# Patient Record
Sex: MALE | URBAN - METROPOLITAN AREA
[De-identification: names, ages, dates, MRNs, and addresses within clinical notes are randomized per-mention and may not be internally consistent; named-entity substitution may affect disease eponyms.]

---

## 2021-11-29 ENCOUNTER — APPOINTMENT (OUTPATIENT)
Dept: URBAN - METROPOLITAN AREA CLINIC 234 | Age: 71
Setting detail: DERMATOLOGY
End: 2021-11-29

## 2021-11-29 VITALS — WEIGHT: 185 LBS | HEIGHT: 68 IN

## 2021-11-29 DIAGNOSIS — D485 NEOPLASM OF UNCERTAIN BEHAVIOR OF SKIN: ICD-10-CM

## 2021-11-29 PROBLEM — D48.5 NEOPLASM OF UNCERTAIN BEHAVIOR OF SKIN: Status: ACTIVE | Noted: 2021-11-29

## 2021-11-29 PROCEDURE — 11102 TANGNTL BX SKIN SINGLE LES: CPT

## 2021-11-29 PROCEDURE — OTHER BIOPSY BY SHAVE METHOD: OTHER

## 2021-11-29 ASSESSMENT — LOCATION ZONE DERM: LOCATION ZONE: FACE

## 2021-11-29 ASSESSMENT — LOCATION DETAILED DESCRIPTION DERM: LOCATION DETAILED: RIGHT MID TEMPLE

## 2021-11-29 ASSESSMENT — LOCATION SIMPLE DESCRIPTION DERM: LOCATION SIMPLE: RIGHT TEMPLE

## 2021-11-29 NOTE — PROCEDURE: BIOPSY BY SHAVE METHOD
Cryotherapy Text: The wound bed was treated with cryotherapy after the biopsy was performed.
Biopsy Type: H and E
Validate Lesion Size: No
Detail Level: Detailed
Notification Instructions: Patient will be notified of biopsy results. However, patient instructed to call the office if not contacted within 2 weeks.
Was A Bandage Applied: Yes
Consent: Written consent was obtained and risks were reviewed including but not limited to scarring, infection, bleeding, scabbing, incomplete removal, nerve damage and allergy to anesthesia.
Billing Type: Third-Party Bill
Anesthesia Volume In Cc (Will Not Render If 0): 0.5
Silver Nitrate Text: The wound bed was treated with silver nitrate after the biopsy was performed.
X Size Of Lesion In Cm: 0
Curettage Text: The wound bed was treated with curettage after the biopsy was performed.
Depth Of Biopsy: dermis
Wound Care: Petrolatum
Electrodesiccation And Curettage Text: The wound bed was treated with electrodesiccation and curettage after the biopsy was performed.
Dressing: bandage
Post-Care Instructions: I reviewed with the patient in detail post-care instructions. Patient is to keep the biopsy site dry overnight, and then apply bacitracin twice daily until healed. Patient may apply hydrogen peroxide soaks to remove any crusting.
Information: Selecting Yes will display possible errors in your note based on the variables you have selected. This validation is only offered as a suggestion for you. PLEASE NOTE THAT THE VALIDATION TEXT WILL BE REMOVED WHEN YOU FINALIZE YOUR NOTE. IF YOU WANT TO FAX A PRELIMINARY NOTE YOU WILL NEED TO TOGGLE THIS TO 'NO' IF YOU DO NOT WANT IT IN YOUR FAXED NOTE.
Anesthesia Type: 1% lidocaine with epinephrine and a 1:10 solution of 8.4% sodium bicarbonate
Biopsy Method: double edge Personna blade
Electrodesiccation Text: The wound bed was treated with electrodesiccation after the biopsy was performed.
Type Of Destruction Used: Curettage
Size Of Lesion In Cm: 0.3
Hemostasis: Nuvia's

## 2022-11-07 ENCOUNTER — APPOINTMENT (OUTPATIENT)
Dept: URBAN - METROPOLITAN AREA CLINIC 234 | Age: 72
Setting detail: DERMATOLOGY
End: 2022-11-07

## 2022-11-07 DIAGNOSIS — L30.9 DERMATITIS, UNSPECIFIED: ICD-10-CM

## 2022-11-07 PROCEDURE — OTHER PRESCRIPTION: OTHER

## 2022-11-07 PROCEDURE — 96372 THER/PROPH/DIAG INJ SC/IM: CPT | Mod: 59

## 2022-11-07 PROCEDURE — OTHER INTRAMUSCULAR KENALOG: OTHER

## 2022-11-07 PROCEDURE — OTHER MIPS QUALITY: OTHER

## 2022-11-07 PROCEDURE — 11104 PUNCH BX SKIN SINGLE LESION: CPT

## 2022-11-07 PROCEDURE — OTHER BIOPSY BY PUNCH METHOD: OTHER

## 2022-11-07 PROCEDURE — 11105 PUNCH BX SKIN EA SEP/ADDL: CPT

## 2022-11-07 PROCEDURE — OTHER COUNSELING: OTHER

## 2022-11-07 RX ORDER — PREDNISONE 20 MG/1
TABLET ORAL
Qty: 42 | Refills: 0 | Status: ERX | COMMUNITY
Start: 2022-11-07

## 2022-11-07 ASSESSMENT — LOCATION SIMPLE DESCRIPTION DERM
LOCATION SIMPLE: RIGHT BUTTOCK
LOCATION SIMPLE: LEFT ANTERIOR NECK

## 2022-11-07 ASSESSMENT — LOCATION ZONE DERM
LOCATION ZONE: NECK
LOCATION ZONE: TRUNK

## 2022-11-07 ASSESSMENT — LOCATION DETAILED DESCRIPTION DERM
LOCATION DETAILED: LEFT CLAVICULAR NECK
LOCATION DETAILED: RIGHT BUTTOCK

## 2022-11-07 NOTE — PROCEDURE: MIPS QUALITY
Quality 431: Preventive Care And Screening: Unhealthy Alcohol Use - Screening: Patient not identified as an unhealthy alcohol user when screened for unhealthy alcohol use using a systematic screening method
Quality 111:Pneumonia Vaccination Status For Older Adults: Pneumococcal vaccine (PPSV23) was not administered on or after patient’s 60th birthday and before the end of the measurement period, reason not otherwise specified
Detail Level: Detailed
Quality 110: Preventive Care And Screening: Influenza Immunization: Influenza immunization was not ordered or administered, reason not given
Quality 226: Preventive Care And Screening: Tobacco Use: Screening And Cessation Intervention: Patient screened for tobacco use and is an ex/non-smoker
Quality 130: Documentation Of Current Medications In The Medical Record: Current Medications Documented

## 2022-11-07 NOTE — PROCEDURE: BIOPSY BY PUNCH METHOD
Problem: Communication  Goal: The ability to communicate needs accurately and effectively will improve  Outcome: PROGRESSING AS EXPECTED     Problem: Safety  Goal: Will remain free from injury  Outcome: PROGRESSING AS EXPECTED     Problem: Infection  Goal: Will remain free from infection  Outcome: PROGRESSING AS EXPECTED     
  Problem: Communication  Goal: The ability to communicate needs accurately and effectively will improve  Outcome: PROGRESSING AS EXPECTED     Problem: Safety  Goal: Will remain free from injury  Outcome: PROGRESSING AS EXPECTED     Problem: Knowledge Deficit  Goal: Knowledge of disease process/condition, treatment plan, diagnostic tests, and medications will improve  Outcome: PROGRESSING AS EXPECTED     
  Problem: Communication  Goal: The ability to communicate needs accurately and effectively will improve  Outcome: PROGRESSING AS EXPECTED     Problem: Safety  Goal: Will remain free from injury  Outcome: PROGRESSING AS EXPECTED     Problem: Knowledge Deficit  Goal: Knowledge of disease process/condition, treatment plan, diagnostic tests, and medications will improve  Outcome: PROGRESSING AS EXPECTED     Problem: Pain Management  Goal: Pain level will decrease to patient's comfort goal  Outcome: PROGRESSING AS EXPECTED     Plan of care discussed with patient. Encouraged to voice feelings or concerns. Calls appropriately. Hourly rounding and fall precautions in place. Bed alarm on, bed locked and in lowest position. Call light and bedside table within reach. Pain well managed. Encouraged to call if pain becomes no longer tolerable.  
  Problem: Communication  Goal: The ability to communicate needs accurately and effectively will improve  Outcome: PROGRESSING AS EXPECTED     Problem: Safety  Goal: Will remain free from injury  Outcome: PROGRESSING AS EXPECTED  Goal: Will remain free from falls  Outcome: PROGRESSING AS EXPECTED     Problem: Infection  Goal: Will remain free from infection  Outcome: PROGRESSING AS EXPECTED     Problem: Venous Thromboembolism (VTW)/Deep Vein Thrombosis (DVT) Prevention:  Goal: Patient will participate in Venous Thrombosis (VTE)/Deep Vein Thrombosis (DVT)Prevention Measures  Outcome: PROGRESSING AS EXPECTED     
  Problem: Communication  Goal: The ability to communicate needs accurately and effectively will improve  Outcome: PROGRESSING AS EXPECTED     Problem: Safety  Goal: Will remain free from injury  Outcome: PROGRESSING AS EXPECTED  Goal: Will remain free from falls  Outcome: PROGRESSING AS EXPECTED     Problem: Infection  Goal: Will remain free from infection  Outcome: PROGRESSING AS EXPECTED     Problem: Venous Thromboembolism (VTW)/Deep Vein Thrombosis (DVT) Prevention:  Goal: Patient will participate in Venous Thrombosis (VTE)/Deep Vein Thrombosis (DVT)Prevention Measures  Outcome: PROGRESSING AS EXPECTED     Problem: Bowel/Gastric:  Goal: Normal bowel function is maintained or improved  Outcome: PROGRESSING AS EXPECTED  Goal: Will not experience complications related to bowel motility  Outcome: PROGRESSING AS EXPECTED     
  Problem: Communication  Goal: The ability to communicate needs accurately and effectively will improve  Outcome: PROGRESSING AS EXPECTED     Problem: Safety  Goal: Will remain free from injury  Outcome: PROGRESSING AS EXPECTED  Goal: Will remain free from falls  Outcome: PROGRESSING AS EXPECTED     Problem: Infection  Goal: Will remain free from infection  Outcome: PROGRESSING AS EXPECTED     Problem: Venous Thromboembolism (VTW)/Deep Vein Thrombosis (DVT) Prevention:  Goal: Patient will participate in Venous Thrombosis (VTE)/Deep Vein Thrombosis (DVT)Prevention Measures  Outcome: PROGRESSING AS EXPECTED     Problem: Bowel/Gastric:  Goal: Normal bowel function is maintained or improved  Outcome: PROGRESSING AS EXPECTED  Goal: Will not experience complications related to bowel motility  Outcome: PROGRESSING AS EXPECTED     Problem: Knowledge Deficit  Goal: Knowledge of disease process/condition, treatment plan, diagnostic tests, and medications will improve  Outcome: PROGRESSING AS EXPECTED  Goal: Knowledge of the prescribed therapeutic regimen will improve  Outcome: PROGRESSING AS EXPECTED     Problem: Pain Management  Goal: Pain level will decrease to patient's comfort goal  Outcome: PROGRESSING AS EXPECTED     Problem: Respiratory:  Goal: Respiratory status will improve  Outcome: PROGRESSING AS EXPECTED     Problem: Skin Integrity  Goal: Risk for impaired skin integrity will decrease  Outcome: PROGRESSING AS EXPECTED     
  Problem: Communication  Goal: The ability to communicate needs accurately and effectively will improve  Outcome: PROGRESSING AS EXPECTED   Plan of care reviewed with patient, verbalizes understanding. Call light in reach, calls appropriately.    Problem: Safety  Goal: Will remain free from injury  Outcome: PROGRESSING AS EXPECTED  Fall precautions in place. Pt rounded on hourly. Bed alarm on, bed locked and in lowest position.     Problem: Knowledge Deficit  Goal: Knowledge of disease process/condition, treatment plan, diagnostic tests, and medications will improve  Outcome: PROGRESSING AS EXPECTED     Problem: Pain Management  Goal: Pain level will decrease to patient's comfort goal  Outcome: PROGRESSING AS EXPECTED   Pain managed by PRN medication. Encouraged to call if pain no longer tolerable.  
  Problem: Infection  Goal: Will remain free from infection  Intervention: Implement standard precautions and perform hand washing before and after patient contact  Note: Standard precuations and hand hygiene performed before during and after each patient interactoin        Problem: Venous Thromboembolism (VTW)/Deep Vein Thrombosis (DVT) Prevention:  Goal: Patient will participate in Venous Thrombosis (VTE)/Deep Vein Thrombosis (DVT)Prevention Measures  Intervention: Ensure patient wears graduated elastic stockings (RENETTA hose) and/or SCDs, if ordered, when in bed or chair (Remove at least once per shift for skin check)  Note: SCDs will remain on while patient remains in bed except when performing 2 Rn skin check and bed bath       
  Problem: Pain Management  Goal: Pain level will decrease to patient's comfort goal  Outcome: PROGRESSING AS EXPECTED  Intervention: Follow pain managment plan developed in collaboration with patient and Interdisciplinary Team  Note: Medicating patient appropriately with prescribed regimen. Patient reporting decreased pain and showing no signs of distress       Problem: Skin Integrity  Goal: Risk for impaired skin integrity will decrease  Outcome: PROGRESSING AS EXPECTED  Intervention: Assess risk factors for impaired skin integrity and/or pressure ulcers  Note: Assessing skin integrity daily. Q2 turns in place. Rotating medical devices, mepilex in place, barrier cream as needed.       
  Problem: Respiratory:  Goal: Respiratory status will improve  Intervention: Administer and titrate oxygen therapy  Note: Pt currently on 1L nasal cannula. Will titrate as tolerated throughout the night        Problem: Knowledge Deficit  Goal: Knowledge of the prescribed therapeutic regimen will improve  Intervention: Discuss information regarding therpeutic regimen and document in education  Note: Discussion and education with family regarding current injury, prognosis and plan for rehabilitation     
  Problem: Respiratory:  Goal: Respiratory status will improve  Intervention: Educate and encourage incentive spirometry usage  Note: Current IS volume is 500. Will provide education/encouragement to increase effectiveness     Problem: Mobility  Goal: Risk for activity intolerance will decrease  Intervention: Encourage patient to increase activity level in collaboration with Interdisciplinary Team  Note: Provided education regarding mobilization. Plan to mobilize up to chair in early morning.      
  Problem: Safety  Goal: Will remain free from falls  Outcome: PROGRESSING AS EXPECTED     Problem: Bowel/Gastric:  Goal: Normal bowel function is maintained or improved  Outcome: PROGRESSING SLOWER THAN EXPECTED  Note: Pt receiving bowel meds as appropriate     Problem: Pain Management  Goal: Pain level will decrease to patient's comfort goal  Outcome: PROGRESSING AS EXPECTED     Problem: Respiratory:  Goal: Respiratory status will improve  Outcome: PROGRESSING AS EXPECTED     
  Problem: Safety  Goal: Will remain free from injury  Outcome: NOT MET     Problem: Infection  Goal: Will remain free from infection  Outcome: PROGRESSING AS EXPECTED     Problem: Venous Thromboembolism (VTW)/Deep Vein Thrombosis (DVT) Prevention:  Goal: Patient will participate in Venous Thrombosis (VTE)/Deep Vein Thrombosis (DVT)Prevention Measures  Outcome: PROGRESSING AS EXPECTED     Problem: Bowel/Gastric:  Goal: Normal bowel function is maintained or improved  Outcome: NOT MET     Problem: Knowledge Deficit  Goal: Knowledge of disease process/condition, treatment plan, diagnostic tests, and medications will improve  Outcome: NOT MET     Problem: Respiratory:  Goal: Respiratory status will improve  Outcome: NOT MET     Problem: Skin Integrity  Goal: Risk for impaired skin integrity will decrease  Outcome: NOT MET     
  Problem: Safety  Goal: Will remain free from injury  Outcome: PROGRESSING AS EXPECTED  Goal: Will remain free from falls  Outcome: PROGRESSING AS EXPECTED     Problem: Venous Thromboembolism (VTW)/Deep Vein Thrombosis (DVT) Prevention:  Goal: Patient will participate in Venous Thrombosis (VTE)/Deep Vein Thrombosis (DVT)Prevention Measures  Outcome: PROGRESSING AS EXPECTED  Lovenox and SCDs in use     Problem: Pain Management  Goal: Pain level will decrease to patient's comfort goal  Outcome: PROGRESSING AS EXPECTED   PRN pain meds in use  
  Problem: Skin Integrity  Goal: Risk for impaired skin integrity will decrease  Outcome: PROGRESSING AS EXPECTED  Intervention: Assess risk factors for impaired skin integrity and/or pressure ulcers  Note: Assessing skin integrity daily. Q2 turns in place. Rotating medical devices, mepilex in place, barrier cream as needed.       Problem: Pain Management  Goal: Pain level will decrease to patient's comfort goal  Flowsheets (Taken 2/15/2021 8856)  Non Verbal Scale: Tense Body Language  Pain Rating Scale (NPRS): 8  Note: Medicating patient appropriately with prescribed regimen. Patient reporting decreased pain and showing no signs of distress       
  Problem: Venous Thromboembolism (VTW)/Deep Vein Thrombosis (DVT) Prevention:  Goal: Patient will participate in Venous Thrombosis (VTE)/Deep Vein Thrombosis (DVT)Prevention Measures  Outcome: PROGRESSING AS EXPECTED  Lovenox in use and SCDs on     Problem: Pain Management  Goal: Pain level will decrease to patient's comfort goal  Outcome: PROGRESSING AS EXPECTED   PRN pain meds in use  
Problem: Discharge Barriers/Planning  Goal: Patient's continuum of care needs will be met  Outcome: PROGRESSING AS EXPECTED  Patient is up for discharge today to  Rehab.     Problem: Pain Management  Goal: Pain level will decrease to patient's comfort goal  Outcome: PROGRESSING AS EXPECTED  Patient's pain is well controlled with current pain regimen. Pt understands to notify staff if pain increases. Call light within reach.     
Problem: Mobility  Goal: Risk for activity intolerance will decrease  Outcome: PROGRESSING AS EXPECTED  Patient able to stand and transfer from bed to recliner with 2-person hand-held assist. Patient has weakness in left leg and needs assistance moving leg during ambulation.     Problem: Urinary Elimination:  Goal: Ability to reestablish a normal urinary elimination pattern will improve  Outcome: PROGRESSING AS EXPECTED  Patient is voiding without complications. Incontinent at times. PureWick in place.   
Problem: Psychosocial Needs:  Goal: Level of anxiety will decrease  Outcome: PROGRESSING AS EXPECTED  Patient has no complaints of anxiety at this time.  at bedside.     Problem: Mobility  Goal: Risk for activity intolerance will decrease  Outcome: PROGRESSING AS EXPECTED  Patient is working with PT/OT and encouraged to transfer to chair for meals.   
Biopsy Type: H and E
Additional Anesthesia Volume In Cc (Will Not Render If 0): 0
Biopsy Type: DIF
Consent: Written consent was obtained and risks were reviewed including but not limited to scarring, infection, bleeding, scabbing, incomplete removal, nerve damage and allergy to anesthesia.
Anesthesia Type: 1% lidocaine with epinephrine
Wound Care: Petrolatum
Was A Bandage Applied: Yes
Bill For Surgical Tray: no
Epidermal Sutures: 4-0 Ethilon
Home Suture Removal Text: Patient was provided a home suture removal kit and will remove their sutures at home.  If they have any questions or difficulties they will call the office.
Dressing: bandage
Suture Removal: 14 days
Billing Type: Third-Party Bill
Punch Size In Mm: 4
Detail Level: Detailed
Hemostasis: None
Post-Care Instructions: I reviewed with the patient in detail post-care instructions. Patient is to keep the biopsy site dry overnight, and then apply bacitracin twice daily until healed. Patient may apply hydrogen peroxide soaks to remove any crusting.
Information: Selecting Yes will display possible errors in your note based on the variables you have selected. This validation is only offered as a suggestion for you. PLEASE NOTE THAT THE VALIDATION TEXT WILL BE REMOVED WHEN YOU FINALIZE YOUR NOTE. IF YOU WANT TO FAX A PRELIMINARY NOTE YOU WILL NEED TO TOGGLE THIS TO 'NO' IF YOU DO NOT WANT IT IN YOUR FAXED NOTE.
Anesthesia Volume In Cc (Will Not Render If 0): 0.5
Notification Instructions: Patient will be notified of biopsy results. However, patient instructed to call the office if not contacted within 2 weeks.

## 2022-11-21 ENCOUNTER — APPOINTMENT (OUTPATIENT)
Dept: URBAN - METROPOLITAN AREA CLINIC 234 | Age: 72
Setting detail: DERMATOLOGY
End: 2022-11-21

## 2022-11-21 DIAGNOSIS — Z48.02 ENCOUNTER FOR REMOVAL OF SUTURES: ICD-10-CM

## 2022-11-21 DIAGNOSIS — L93.0 DISCOID LUPUS ERYTHEMATOSUS: ICD-10-CM

## 2022-11-21 PROCEDURE — OTHER INTRAMUSCULAR KENALOG: OTHER

## 2022-11-21 PROCEDURE — 96372 THER/PROPH/DIAG INJ SC/IM: CPT

## 2022-11-21 PROCEDURE — OTHER MIPS QUALITY: OTHER

## 2022-11-21 PROCEDURE — OTHER ORDER TESTS: OTHER

## 2022-11-21 PROCEDURE — OTHER COUNSELING: OTHER

## 2022-11-21 PROCEDURE — OTHER SUTURE REMOVAL (GLOBAL PERIOD): OTHER

## 2022-11-21 ASSESSMENT — LOCATION DETAILED DESCRIPTION DERM
LOCATION DETAILED: LEFT CLAVICULAR SKIN
LOCATION DETAILED: LEFT LATERAL SUPERIOR CHEST
LOCATION DETAILED: LEFT CLAVICULAR SKIN
LOCATION DETAILED: LEFT CLAVICULAR NECK
LOCATION DETAILED: LEFT INFERIOR LATERAL LOWER BACK

## 2022-11-21 ASSESSMENT — LOCATION ZONE DERM
LOCATION ZONE: TRUNK
LOCATION ZONE: TRUNK
LOCATION ZONE: NECK

## 2022-11-21 ASSESSMENT — LOCATION SIMPLE DESCRIPTION DERM
LOCATION SIMPLE: CHEST
LOCATION SIMPLE: LEFT CLAVICULAR SKIN
LOCATION SIMPLE: LEFT CLAVICULAR SKIN
LOCATION SIMPLE: LEFT ANTERIOR NECK
LOCATION SIMPLE: LEFT LOWER BACK

## 2022-11-21 NOTE — PROCEDURE: SUTURE REMOVAL (GLOBAL PERIOD)
Detail Level: Detailed
Add 39993 Cpt? (Important Note: In 2017 The Use Of 00179 Is Being Tracked By Cms To Determine Future Global Period Reimbursement For Global Periods): no

## 2022-11-21 NOTE — PROCEDURE: SUTURE REMOVAL (GLOBAL PERIOD)
Add 19753 Cpt? (Important Note: In 2017 The Use Of 00645 Is Being Tracked By Cms To Determine Future Global Period Reimbursement For Global Periods): no
Detail Level: Detailed

## 2022-12-08 ENCOUNTER — APPOINTMENT (OUTPATIENT)
Dept: URBAN - METROPOLITAN AREA CLINIC 234 | Age: 72
Setting detail: DERMATOLOGY
End: 2022-12-08

## 2022-12-08 DIAGNOSIS — D89.89 OTHER SPECIFIED DISORDERS INVOLVING THE IMMUNE MECHANISM, NOT ELSEWHERE CLASSIFIED: ICD-10-CM

## 2022-12-08 PROCEDURE — OTHER DEFER: OTHER

## 2022-12-08 PROCEDURE — OTHER COUNSELING: OTHER

## 2022-12-08 PROCEDURE — 99212 OFFICE O/P EST SF 10 MIN: CPT

## 2022-12-08 PROCEDURE — OTHER MIPS QUALITY: OTHER

## 2022-12-08 NOTE — PROCEDURE: DEFER
X Size Of Lesion In Cm (Optional): 0
Reason To Defer Override: dr garg
Introduction Text (Please End With A Colon): The following procedure was deferred:
Detail Level: Detailed

## 2022-12-08 NOTE — PROCEDURE: MIPS QUALITY
Quality 130: Documentation Of Current Medications In The Medical Record: Current Medications Documented
Quality 226: Preventive Care And Screening: Tobacco Use: Screening And Cessation Intervention: Patient screened for tobacco use and is an ex/non-smoker
Detail Level: Detailed
Quality 431: Preventive Care And Screening: Unhealthy Alcohol Use - Screening: Patient not identified as an unhealthy alcohol user when screened for unhealthy alcohol use using a systematic screening method
Quality 110: Preventive Care And Screening: Influenza Immunization: Influenza Immunization previously received during influenza season
Quality 111:Pneumonia Vaccination Status For Older Adults: Pneumococcal vaccine (PPSV23) administered on or after patient’s 60th birthday and before the end of the measurement period

## 2023-07-11 ENCOUNTER — HOSPITAL ENCOUNTER (OUTPATIENT)
Dept: HOSPITAL 47 - LABWHC1 | Age: 73
Discharge: HOME | End: 2023-07-11
Attending: ORTHOPAEDIC SURGERY
Payer: COMMERCIAL

## 2023-07-11 DIAGNOSIS — Z96.641: ICD-10-CM

## 2023-07-11 DIAGNOSIS — M25.551: Primary | ICD-10-CM

## 2023-07-11 DIAGNOSIS — Z47.1: ICD-10-CM

## 2023-07-11 PROCEDURE — 86140 C-REACTIVE PROTEIN: CPT

## 2023-07-11 PROCEDURE — 36415 COLL VENOUS BLD VENIPUNCTURE: CPT

## 2023-09-20 ENCOUNTER — APPOINTMENT (OUTPATIENT)
Dept: URBAN - NONMETROPOLITAN AREA CLINIC 51 | Age: 73
Setting detail: DERMATOLOGY
End: 2023-09-21

## 2023-09-20 DIAGNOSIS — L73.8 OTHER SPECIFIED FOLLICULAR DISORDERS: ICD-10-CM

## 2023-09-20 PROCEDURE — OTHER MIPS QUALITY: OTHER

## 2023-09-20 PROCEDURE — OTHER COUNSELING: OTHER

## 2023-09-20 PROCEDURE — 99212 OFFICE O/P EST SF 10 MIN: CPT

## 2023-09-20 ASSESSMENT — LOCATION ZONE DERM
LOCATION ZONE: LIP
LOCATION ZONE: FACE
LOCATION ZONE: ARM

## 2023-09-20 ASSESSMENT — LOCATION DETAILED DESCRIPTION DERM
LOCATION DETAILED: LEFT SUPERIOR LATERAL BUCCAL CHEEK
LOCATION DETAILED: LEFT LATERAL MALAR CHEEK
LOCATION DETAILED: LEFT ANTERIOR SHOULDER
LOCATION DETAILED: RIGHT SUPERIOR LATERAL BUCCAL CHEEK
LOCATION DETAILED: RIGHT LOWER CUTANEOUS LIP

## 2023-09-20 ASSESSMENT — LOCATION SIMPLE DESCRIPTION DERM
LOCATION SIMPLE: LEFT SHOULDER
LOCATION SIMPLE: RIGHT CHEEK
LOCATION SIMPLE: LEFT CHEEK
LOCATION SIMPLE: RIGHT LIP

## 2023-11-15 ENCOUNTER — APPOINTMENT (OUTPATIENT)
Dept: URBAN - NONMETROPOLITAN AREA CLINIC 51 | Age: 73
Setting detail: DERMATOLOGY
End: 2023-11-15

## 2023-11-15 DIAGNOSIS — L57.0 ACTINIC KERATOSIS: ICD-10-CM

## 2023-11-15 DIAGNOSIS — L81.0 POSTINFLAMMATORY HYPERPIGMENTATION: ICD-10-CM

## 2023-11-15 DIAGNOSIS — D89.89 OTHER SPECIFIED DISORDERS INVOLVING THE IMMUNE MECHANISM, NOT ELSEWHERE CLASSIFIED: ICD-10-CM

## 2023-11-15 PROCEDURE — 17000 DESTRUCT PREMALG LESION: CPT

## 2023-11-15 PROCEDURE — OTHER COUNSELING: OTHER

## 2023-11-15 PROCEDURE — 96372 THER/PROPH/DIAG INJ SC/IM: CPT | Mod: 59

## 2023-11-15 PROCEDURE — OTHER INTRAMUSCULAR KENALOG: OTHER

## 2023-11-15 PROCEDURE — OTHER LIQUID NITROGEN: OTHER

## 2023-11-15 PROCEDURE — 99212 OFFICE O/P EST SF 10 MIN: CPT | Mod: 25

## 2023-11-15 ASSESSMENT — LOCATION DETAILED DESCRIPTION DERM
LOCATION DETAILED: RIGHT INFERIOR CENTRAL MALAR CHEEK
LOCATION DETAILED: LEFT INFERIOR CENTRAL MALAR CHEEK
LOCATION DETAILED: RIGHT CLAVICULAR NECK
LOCATION DETAILED: RIGHT INFERIOR LATERAL NECK
LOCATION DETAILED: RIGHT BUTTOCK
LOCATION DETAILED: LEFT SUPERIOR LATERAL BUCCAL CHEEK
LOCATION DETAILED: LEFT SUPERIOR LATERAL BUCCAL CHEEK

## 2023-11-15 ASSESSMENT — LOCATION SIMPLE DESCRIPTION DERM
LOCATION SIMPLE: RIGHT BUTTOCK
LOCATION SIMPLE: RIGHT ANTERIOR NECK
LOCATION SIMPLE: LEFT CHEEK
LOCATION SIMPLE: RIGHT CHEEK
LOCATION SIMPLE: LEFT CHEEK

## 2023-11-15 ASSESSMENT — LOCATION ZONE DERM
LOCATION ZONE: TRUNK
LOCATION ZONE: FACE
LOCATION ZONE: FACE
LOCATION ZONE: NECK

## 2024-10-29 ENCOUNTER — HOSPITAL ENCOUNTER (OUTPATIENT)
Dept: HOSPITAL 47 - EC | Age: 74
Setting detail: OBSERVATION
LOS: 2 days | Discharge: HOME | End: 2024-10-31
Attending: HOSPITALIST | Admitting: HOSPITALIST
Payer: MEDICARE

## 2024-10-29 DIAGNOSIS — M47.812: Primary | ICD-10-CM

## 2024-10-29 DIAGNOSIS — Z87.891: ICD-10-CM

## 2024-10-29 DIAGNOSIS — I10: ICD-10-CM

## 2024-10-29 DIAGNOSIS — Z11.52: ICD-10-CM

## 2024-10-29 DIAGNOSIS — Z91.030: ICD-10-CM

## 2024-10-29 DIAGNOSIS — Z11.59: ICD-10-CM

## 2024-10-29 DIAGNOSIS — H53.9: ICD-10-CM

## 2024-10-29 DIAGNOSIS — J02.9: ICD-10-CM

## 2024-10-29 DIAGNOSIS — G89.29: ICD-10-CM

## 2024-10-29 DIAGNOSIS — M54.9: ICD-10-CM

## 2024-10-29 DIAGNOSIS — Z79.899: ICD-10-CM

## 2024-10-29 LAB
ALBUMIN SERPL-MCNC: 4.4 G/DL (ref 3.5–5)
ALP SERPL-CCNC: 58 U/L (ref 38–126)
ALT SERPL-CCNC: 12 U/L (ref 4–49)
ANION GAP SERPL CALC-SCNC: 8 MMOL/L
AST SERPL-CCNC: 23 U/L (ref 17–59)
BASOPHILS # BLD AUTO: 0 K/UL (ref 0–0.2)
BASOPHILS NFR BLD AUTO: 1 %
BUN SERPL-SCNC: 10 MG/DL (ref 9–20)
CALCIUM SPEC-MCNC: 8.9 MG/DL (ref 8.4–10.2)
CHLORIDE SERPL-SCNC: 106 MMOL/L (ref 98–107)
CO2 SERPL-SCNC: 24 MMOL/L (ref 22–30)
EOSINOPHIL # BLD AUTO: 0.1 K/UL (ref 0–0.7)
EOSINOPHIL NFR BLD AUTO: 1 %
ERYTHROCYTE [DISTWIDTH] IN BLOOD BY AUTOMATED COUNT: 4.53 M/UL (ref 4.3–5.9)
ERYTHROCYTE [DISTWIDTH] IN BLOOD: 11.7 % (ref 11.5–15.5)
GLUCOSE BLD-MCNC: 82 MG/DL (ref 70–110)
GLUCOSE SERPL-MCNC: 47 MG/DL (ref 74–99)
HCT VFR BLD AUTO: 44.7 % (ref 39–53)
HGB BLD-MCNC: 14.6 GM/DL (ref 13–17.5)
LYMPHOCYTES # SPEC AUTO: 1.9 K/UL (ref 1–4.8)
LYMPHOCYTES NFR SPEC AUTO: 24 %
MCH RBC QN AUTO: 32.3 PG (ref 25–35)
MCHC RBC AUTO-ENTMCNC: 32.7 G/DL (ref 31–37)
MCV RBC AUTO: 98.7 FL (ref 80–100)
MONOCYTES # BLD AUTO: 0.5 K/UL (ref 0–1)
MONOCYTES NFR BLD AUTO: 6 %
NEUTROPHILS # BLD AUTO: 5.3 K/UL (ref 1.3–7.7)
NEUTROPHILS NFR BLD AUTO: 67 %
PLATELET # BLD AUTO: 266 K/UL (ref 150–450)
POTASSIUM SERPL-SCNC: 4 MMOL/L (ref 3.5–5.1)
PROT SERPL-MCNC: 7.2 G/DL (ref 6.3–8.2)
SODIUM SERPL-SCNC: 138 MMOL/L (ref 137–145)
WBC # BLD AUTO: 7.9 K/UL (ref 3.8–10.6)

## 2024-10-29 PROCEDURE — 96365 THER/PROPH/DIAG IV INF INIT: CPT

## 2024-10-29 PROCEDURE — 87070 CULTURE OTHR SPECIMN AEROBIC: CPT

## 2024-10-29 PROCEDURE — 72125 CT NECK SPINE W/O DYE: CPT

## 2024-10-29 PROCEDURE — 82565 ASSAY OF CREATININE: CPT

## 2024-10-29 PROCEDURE — 96368 THER/DIAG CONCURRENT INF: CPT

## 2024-10-29 PROCEDURE — 83605 ASSAY OF LACTIC ACID: CPT

## 2024-10-29 PROCEDURE — 96372 THER/PROPH/DIAG INJ SC/IM: CPT

## 2024-10-29 PROCEDURE — 87529 HSV DNA AMP PROBE: CPT

## 2024-10-29 PROCEDURE — 99284 EMERGENCY DEPT VISIT MOD MDM: CPT

## 2024-10-29 PROCEDURE — 83036 HEMOGLOBIN GLYCOSYLATED A1C: CPT

## 2024-10-29 PROCEDURE — 70450 CT HEAD/BRAIN W/O DYE: CPT

## 2024-10-29 PROCEDURE — 87798 DETECT AGENT NOS DNA AMP: CPT

## 2024-10-29 PROCEDURE — 82607 VITAMIN B-12: CPT

## 2024-10-29 PROCEDURE — 82746 ASSAY OF FOLIC ACID SERUM: CPT

## 2024-10-29 PROCEDURE — 84157 ASSAY OF PROTEIN OTHER: CPT

## 2024-10-29 PROCEDURE — 80176 ASSAY OF LIDOCAINE: CPT

## 2024-10-29 PROCEDURE — 89050 BODY FLUID CELL COUNT: CPT

## 2024-10-29 PROCEDURE — 71046 X-RAY EXAM CHEST 2 VIEWS: CPT

## 2024-10-29 PROCEDURE — 87636 SARSCOV2 & INF A&B AMP PRB: CPT

## 2024-10-29 PROCEDURE — 80202 ASSAY OF VANCOMYCIN: CPT

## 2024-10-29 PROCEDURE — 86140 C-REACTIVE PROTEIN: CPT

## 2024-10-29 PROCEDURE — 96367 TX/PROPH/DG ADDL SEQ IV INF: CPT

## 2024-10-29 PROCEDURE — 80053 COMPREHEN METABOLIC PANEL: CPT

## 2024-10-29 PROCEDURE — 87498 ENTEROVIRUS PROBE&REVRS TRNS: CPT

## 2024-10-29 PROCEDURE — 99285 EMERGENCY DEPT VISIT HI MDM: CPT

## 2024-10-29 PROCEDURE — 96366 THER/PROPH/DIAG IV INF ADDON: CPT

## 2024-10-29 PROCEDURE — 62270 DX LMBR SPI PNXR: CPT

## 2024-10-29 PROCEDURE — 96375 TX/PRO/DX INJ NEW DRUG ADDON: CPT

## 2024-10-29 PROCEDURE — 36415 COLL VENOUS BLD VENIPUNCTURE: CPT

## 2024-10-29 PROCEDURE — 84443 ASSAY THYROID STIM HORMONE: CPT

## 2024-10-29 PROCEDURE — 85025 COMPLETE CBC W/AUTO DIFF WBC: CPT

## 2024-10-29 PROCEDURE — 96376 TX/PRO/DX INJ SAME DRUG ADON: CPT

## 2024-10-29 PROCEDURE — 82945 GLUCOSE OTHER FLUID: CPT

## 2024-10-29 PROCEDURE — 87651 STREP A DNA AMP PROBE: CPT

## 2024-10-29 PROCEDURE — 87040 BLOOD CULTURE FOR BACTERIA: CPT

## 2024-10-29 PROCEDURE — 85652 RBC SED RATE AUTOMATED: CPT

## 2024-10-29 PROCEDURE — 87205 SMEAR GRAM STAIN: CPT

## 2024-10-29 PROCEDURE — 86592 SYPHILIS TEST NON-TREP QUAL: CPT

## 2024-10-29 PROCEDURE — 80048 BASIC METABOLIC PNL TOTAL CA: CPT

## 2024-10-29 PROCEDURE — 87496 CYTOMEG DNA AMP PROBE: CPT

## 2024-10-29 RX ADMIN — KETOROLAC TROMETHAMINE STA MG: 15 INJECTION, SOLUTION INTRAMUSCULAR; INTRAVENOUS at 21:24

## 2024-10-29 RX ADMIN — ACETAMINOPHEN STA MG: 500 TABLET ORAL at 17:33

## 2024-10-29 RX ADMIN — SODIUM CHLORIDE STA: 9 INJECTION, SOLUTION INTRAVENOUS at 23:32

## 2024-10-29 RX ADMIN — DEXTROSE STA MG: 50 INJECTION, SOLUTION INTRAVENOUS at 21:19

## 2024-10-29 RX ADMIN — AMPICILLIN STA MLS/HR: 2 INJECTION, POWDER, FOR SOLUTION INTRAVENOUS at 22:28

## 2024-10-29 NOTE — CT
EXAMINATION TYPE: CT brain wo con

 

DATE OF EXAM: 10/29/2024

 

COMPARISON: None

 

INDICATION: fever/neck ache, neck stiffness, vision changes

 

DLP: 1164.4 mGycm, Automated exposure control for dose reduction was used.

 

CONTRAST: None

 

CT of the brain is performed utilizing 3 mm thick sections through the posterior fossa and 3 mm thick
 sections through the remaining calvarium.  Study is performed within 24 hours of arrival to the hosp
ital. 

 

No abnormal hyperdensity is present to suggest an acute intracranial hemorrhage.

No mass lesion is evident.

No acute infarcts are evident.

Ventricles and sulci are appropriate for the patient age.  Minimal prominence of the extra-axial spac
es is present.

Paranasal sinuses and mastoid air cells within the field-of-view are clear.

 

IMPRESSION:

1.   No acute intracranial process. Follow up MRI can be performed as clinically indicated.

 

 

 

[facility], Workstation: [facility], 10/29/2024 5:11 PM

## 2024-10-29 NOTE — CT
EXAMINATION TYPE: CT cervical spine wo con

 

DATE OF EXAM: 10/29/2024

 

COMPARISON: None

 

HISTORY: Neck pain.

 

CT DLP: 457.5 mGycm

 

CONTRAST: None

 

CT of the cervical spine is performed in the axial plane at 2 mm thick sections.  Reconstructed image
s in the coronal, and sagittal plane are reviewed on the computer. 

 

No acute fractures are evident.

 

Vertebral body alignment is normal.

There is loss of disc height C6-7 with vacuum disc phenomenon. Disc space narrowing is present C5-6. 
Small posterior inferior spur is at C5. 

Vertebral body heights are preserved.

No spinal canal stenosis is evident

Left foraminal narrowing from facet hypertrophy and uncovertebral joint hypertrophy is present at C3-
4. Mild bilateral foraminal narrowing is present C5-6 and on the left at C6-7

 

IMPRESSION:

1.   No acute osseous abnormality cervical spine.

2. 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

 

[facility], Workstation: [facility], 10/29/2024 7:24 PM

## 2024-10-29 NOTE — ED
Neck Injury/Pain HPI





- General


Chief Complaint: Neck Pain/Injury


Stated Complaint: poss meningitis


Time Seen by Provider: 10/29/24 16:00


Source: patient, RN notes reviewed


Mode of arrival: ambulatory


Limitations: no limitations





- History of Present Illness


Initial Comments: 





74-year-old male presenting to the ER for neck stiffness.  Patient was seen by JOSE A Henderson from [facility] and was sent to ER for possible meningitis. 

Patient reports he has had a cough, nasal congestion, sore throat x 1 week.  

Over the course of the week he has had progressive neck stiffness and headache. 

States he "cannot focus with my eyes".  Denies fevers, denies trauma or injury. 

He has never had this before.  Denies altered mental status or confusion.  No re

cent travel.  He is up-to-date on all vaccinations except for COVID-19.  History

of hypertension.





- Related Data


                                Home Medications











 Medication  Instructions  Recorded  Confirmed


 


Telmisartan 20 mg PO DAILY 10/29/24 10/29/24











                                    Allergies











Allergy/AdvReac Type Severity Reaction Status Date / Time


 


bee venom protein (honey bee) Allergy  Anaphylaxis Verified 10/29/24 19:57














Review of Systems


ROS Statement: 


Those systems with pertinent positive or pertinent negative responses have been 

documented in the HPI.





ROS Other: All systems not noted in ROS Statement are negative.





Past Medical History


Past Medical History: Hypertension


Additional Past Surgical History / Comment(s): bilateral hips, right shoulder 

replacement,


Past Psychological History: No Psychological Hx Reported


Smoking Status: Former smoker


Past Alcohol Use History: Daily


Past Drug Use History: None Reported





General Exam


Limitations: no limitations


General appearance: alert, in no apparent distress


Head exam: Present: atraumatic, normocephalic, normal inspection


Eye exam: Present: normal appearance, PERRL, EOMI.  Absent: scleral icterus, 

conjunctival injection, periorbital swelling


ENT exam: Present: normal exam, normal oropharynx, mucous membranes moist


Neck exam: Present: normal inspection, other (Negative Kernig's and Bruskinsi 

sign).  Absent: tenderness, meningismus, full ROM (Limited active flexion and 

extension of neck, full passive flexion and extension with pain), 

lymphadenopathy


Respiratory exam: Present: normal lung sounds bilaterally.  Absent: respiratory 

distress, wheezes, rales, rhonchi, stridor


Cardiovascular Exam: Present: regular rate, normal rhythm, normal heart sounds. 

Absent: systolic murmur, diastolic murmur, rubs, gallop, clicks


Neurological exam: Present: alert, oriented X3, CN II-XII intact


Psychiatric exam: Present: normal affect, normal mood


Skin exam: Present: warm, dry, intact, normal color.  Absent: rash





Course


                                   Vital Signs











  10/29/24 10/29/24 10/29/24





  15:26 16:15 18:45


 


Temperature 98.1 F 98 F 97.5 F L


 


Pulse Rate 78 75 54 L


 


Respiratory 18 18 





Rate   


 


Blood Pressure 160/89 143/99 139/83


 


O2 Sat by Pulse 99 99 99





Oximetry   














  10/29/24





  22:08


 


Temperature 


 


Pulse Rate 57 L


 


Respiratory 17





Rate 


 


Blood Pressure 137/80


 


O2 Sat by Pulse 100





Oximetry 














Medical Decision Making





- Medical Decision Making





Was pt. sent in by a medical professional or institution (, PA, NP, urgent 

care, hospital, or nursing home...) When possible be specific


@ -Sent by Dr. Henderson from [place] for possible meningitis


Did you speak to anyone other than the patient for history (EMS, parent, family,

police, friend...)? What history was obtained from this source 


@ -No


Did you review nursing and triage notes (agree or disagree)? Why? 


@ -I reviewed and agree with nursing and triage notes


Were old charts reviewed (outside hosp., previous admission, EMS record, old 

EKG, old radiological studies, urgent care reports/EKG's, nursing home records)?

Report findings 


@ -No old charts were reviewed


Differential Diagnosis (chest pain, altered mental status, abdominal pain women,

abdominal pain men, vaginal bleeding, weakness, fever, dyspnea, syncope, 

headache, dizziness, GI bleed, back pain, seizure, CVA, palpatations, mental 

health, musculoskeletal)? 


@ -Differential Headache:


Meningitis, viral URI, muscular strain, migraine, tension, cluster, carbon 

monoxide, central venous thrombosis, pension karma temporal arteritis, acute 

closure glaucoma, intercranial hemorrhage, mastoiditis, sinusitis, head injury, 

this is not meant to be an all-inclusive list.





EKG interpreted by me (3pts min.).


@ -None


X-rays interpreted by me (1pt min.).


@ -Chest x-ray reveals no acute process


CT interpreted by me (1pt min.).


@ -CT head and neck revealed no acute process


U/S interpreted by me (1pt. min.).


@ -None done


What testing was considered but not performed or refused? (CT, X-rays, U/S, labs

)? Why?


@ -None


What meds were considered but not given or refused? Why?


@ -None


Did you discuss the management of the patient with other professionals 

(professionals i.e. Dr., PA, NP, lab, RT, psych nurse, , , 

teacher, , )? Give summary


@ -I spoke with Dr. Eller on-call neurologist who recommends admission for 

lumbar puncture


Was smoking cessation discussed for >3mins.?


@ -No


Was critical care preformed (if so, how long)?


@ -No


Were there social determinants of health that impacted care today? How? 

(Homelessness, low income, unemployed, alcoholism, drug addiction, 

transportation, low edu. Level, literacy, decrease access to med. care, skilled nursing, 

rehab)?


@ -No


Was there de-escalation of care discussed even if they declined (Discuss DNR or 

withdrawal of care, Hospice)? DNR status


@ -No


What co-morbidities impacted this encounter? (DM, HTN, Smoking, COPD, CAD, 

Cancer, CVA, ARF, Chemo, Hep., AIDS, mental health diagnosis, sleep apnea, 

morbid obesity)?


@ -None


Was patient admitted / discharged? Hospital course, mention meds given and 

route, prescriptions, significant lab abnormalities, going to OR and other 

pertinent info.


@ -Admitted.  This is a 74-year-old male with history of hypertension presenting

with neck pain x 3 days.  Patient has been experiencing cough and sore throat 

for the past week.  He is experiencing pain and limited range of motion of neck.

 He was sent by Dr. Henderson from [place] for possible meningitis.  Patient is 

afebrile, vital signs within acceptable limits.  There is limited active range 

of motion of neck on examination, full passive range of motion with pain.  

Negative Kernig's and Bruzinski sign.  Neurovascularly intact.  Patient was 

provided with Tylenol and Toradol for symptom control.  Lab work including CBC, 

CMP, lactic acid remarkable for blood glucose 47, however on recheck is 82.  

Patient was given juice. White blood cell count stable at 7.9.  Cepheid and 

strep negative.  Chest x-ray reveals no acute process.  CT of head and neck 

revealed no acute process.  I spoke with Dr. Eller on-call neurologist who 

recommends admission for lumbar puncture.  Blood cultures were taken.  Patient 

was empirically treated for meningitis and started on IV antibiotics, 

antivirals, and given 1 dose of dexamethasone.  Patient was admitted to medicine

team with consultation to interventional radiology for lumbar puncture.  Case 

was discussed with my ED attending Dr. Rausch.


Undiagnosed new problem with uncertain prognosis?


@ -No


Drug Therapy requiring intensive monitoring for toxicity (Heparin, Nitro, In

sulin, Cardizem)?


@ -No


Were any procedures done?


@ -No


Diagnosis/symptom?


@ -Headache, neck pain


Acute, or Chronic, or Acute on Chronic?


@ -Acute


Uncomplicated (without systemic symptoms) or Complicated (systemic symptoms)?


@ -Complicated


Side effects of treatment?


@ -No


Exacerbation, Progression, or Severe Exacerbation?


@ -No


Poses a threat to life or bodily function? How? (Chest pain, USA, MI, pneumonia,

PE, COPD, DKA, ARF, appy, cholecystitis, CVA, Diverticulitis, Homicidal, 

Suicidal, threat to staff... and all critical care pts)


@ -Yes





- Lab Data


Result diagrams: 


                                 10/29/24 16:45





                                 10/30/24 03:04


                                   Lab Results











  10/29/24 10/29/24 10/29/24 Range/Units





  16:45 16:45 16:45 


 


WBC  7.9    (3.8-10.6)  k/uL


 


RBC  4.53    (4.30-5.90)  m/uL


 


Hgb  14.6    (13.0-17.5)  gm/dL


 


Hct  44.7    (39.0-53.0)  %


 


MCV  98.7    (80.0-100.0)  fL


 


MCH  32.3    (25.0-35.0)  pg


 


MCHC  32.7    (31.0-37.0)  g/dL


 


RDW  11.7    (11.5-15.5)  %


 


Plt Count  266    (150-450)  k/uL


 


MPV  7.5    


 


Neutrophils %  67    %


 


Lymphocytes %  24    %


 


Monocytes %  6    %


 


Eosinophils %  1    %


 


Basophils %  1    %


 


Neutrophils #  5.3    (1.3-7.7)  k/uL


 


Lymphocytes #  1.9    (1.0-4.8)  k/uL


 


Monocytes #  0.5    (0-1.0)  k/uL


 


Eosinophils #  0.1    (0-0.7)  k/uL


 


Basophils #  0.0    (0-0.2)  k/uL


 


Sodium   138   (137-145)  mmol/L


 


Potassium   4.0   (3.5-5.1)  mmol/L


 


Chloride   106   ()  mmol/L


 


Carbon Dioxide   24   (22-30)  mmol/L


 


Anion Gap   8   mmol/L


 


BUN   10   (9-20)  mg/dL


 


Creatinine   0.94   (0.66-1.25)  mg/dL


 


Est GFR (CKD-EPI)AfAm   >90   (>60 ml/min/1.73 sqM)  


 


Est GFR (CKD-EPI)NonAf   81   (>60 ml/min/1.73 sqM)  


 


Glucose   47 L*   (74-99)  mg/dL


 


POC Glucose (mg/dL)     ()  mg/dL


 


POC Glu Operater ID     


 


Plasma Lactic Acid Gutierrez    1.1  (0.7-2.0)  mmol/L


 


Calcium   8.9   (8.4-10.2)  mg/dL


 


Total Bilirubin   0.8   (0.2-1.3)  mg/dL


 


AST   23   (17-59)  U/L


 


ALT   12   (4-49)  U/L


 


Alkaline Phosphatase   58   ()  U/L


 


Total Protein   7.2   (6.3-8.2)  g/dL


 


Albumin   4.4   (3.5-5.0)  g/dL


 


Influenza Type A (PCR)     (Not Detectd)  


 


Influenza Type B (PCR)     (Not Detectd)  


 


RSV (PCR)     (Not Detectd)  


 


SARS-CoV-2 (PCR)     (Not Detectd)  


 


Group A Strep (PCR)     (Not Detectd)  














  10/29/24 10/29/24 10/29/24 Range/Units





  17:10 17:10 17:30 


 


WBC     (3.8-10.6)  k/uL


 


RBC     (4.30-5.90)  m/uL


 


Hgb     (13.0-17.5)  gm/dL


 


Hct     (39.0-53.0)  %


 


MCV     (80.0-100.0)  fL


 


MCH     (25.0-35.0)  pg


 


MCHC     (31.0-37.0)  g/dL


 


RDW     (11.5-15.5)  %


 


Plt Count     (150-450)  k/uL


 


MPV     


 


Neutrophils %     %


 


Lymphocytes %     %


 


Monocytes %     %


 


Eosinophils %     %


 


Basophils %     %


 


Neutrophils #     (1.3-7.7)  k/uL


 


Lymphocytes #     (1.0-4.8)  k/uL


 


Monocytes #     (0-1.0)  k/uL


 


Eosinophils #     (0-0.7)  k/uL


 


Basophils #     (0-0.2)  k/uL


 


Sodium     (137-145)  mmol/L


 


Potassium     (3.5-5.1)  mmol/L


 


Chloride     ()  mmol/L


 


Carbon Dioxide     (22-30)  mmol/L


 


Anion Gap     mmol/L


 


BUN     (9-20)  mg/dL


 


Creatinine     (0.66-1.25)  mg/dL


 


Est GFR (CKD-EPI)AfAm     (>60 ml/min/1.73 sqM)  


 


Est GFR (CKD-EPI)NonAf     (>60 ml/min/1.73 sqM)  


 


Glucose     (74-99)  mg/dL


 


POC Glucose (mg/dL)    82  ()  mg/dL


 


POC Glu Operater ID    Zane Arredondo  


 


Plasma Lactic Acid Gutierrez     (0.7-2.0)  mmol/L


 


Calcium     (8.4-10.2)  mg/dL


 


Total Bilirubin     (0.2-1.3)  mg/dL


 


AST     (17-59)  U/L


 


ALT     (4-49)  U/L


 


Alkaline Phosphatase     ()  U/L


 


Total Protein     (6.3-8.2)  g/dL


 


Albumin     (3.5-5.0)  g/dL


 


Influenza Type A (PCR)   Not Detected   (Not Detectd)  


 


Influenza Type B (PCR)   Not Detected   (Not Detectd)  


 


RSV (PCR)   Not Detected   (Not Detectd)  


 


SARS-CoV-2 (PCR)   Not Detected   (Not Detectd)  


 


Group A Strep (PCR)  NOT DETECTED    (Not Detectd)  














Disposition


Clinical Impression: 


 Neck stiffness, Cough





Disposition: ADMITTED AS IP TO THIS [facility]


Time of Disposition: 11:30

## 2024-10-29 NOTE — XR
EXAMINATION TYPE: XR chest 2V

 

DATE OF EXAM: 10/29/2024

 

COMPARISON: Cough

 

INDICATION: Cough, headaches, stiff neck

 

TECHNIQUE:  Frontal and lateral views of the chest are obtained.

 

FINDINGS:  

The heart size is normal.  

The pulmonary vasculature is normal.

The lungs are clear.

 

IMPRESSION:  

1. No acute pulmonary process.

 

[facility], Workstation: [facility], 10/29/2024 5:00 PM

## 2024-10-30 LAB
GLUCOSE CSF-MCNC: 74 MG/DL (ref 40–70)
NUC CELL # CSF: 0 U/L (ref 0–5)
RBC # CSF: 2 U/L (ref 0–10)
SPECIMEN VOL CSF: 2 ML
VIT B12 SERPL-MCNC: 459 PG/ML (ref 200–944)

## 2024-10-30 PROCEDURE — 009U3ZX DRAINAGE OF SPINAL CANAL, PERCUTANEOUS APPROACH, DIAGNOSTIC: ICD-10-PCS

## 2024-10-30 RX ADMIN — KETOROLAC TROMETHAMINE PRN MG: 15 INJECTION, SOLUTION INTRAMUSCULAR; INTRAVENOUS at 20:17

## 2024-10-30 RX ADMIN — SODIUM CHLORIDE SCH: 900 INJECTION, SOLUTION INTRAVENOUS at 04:57

## 2024-10-30 RX ADMIN — AMPICILLIN SCH MLS/HR: 2 INJECTION, POWDER, FOR SOLUTION INTRAVENOUS at 04:21

## 2024-10-30 RX ADMIN — LOSARTAN POTASSIUM SCH MG: 50 TABLET, FILM COATED ORAL at 13:27

## 2024-10-30 RX ADMIN — SODIUM CHLORIDE SCH MLS/HR: 9 INJECTION, SOLUTION INTRAVENOUS at 12:33

## 2024-10-30 RX ADMIN — SODIUM CHLORIDE ONE MLS/HR: 9 INJECTION, SOLUTION INTRAVENOUS at 00:23

## 2024-10-30 RX ADMIN — SODIUM CHLORIDE SCH MLS/HR: 900 INJECTION, SOLUTION INTRAVENOUS at 00:02

## 2024-10-30 RX ADMIN — ENOXAPARIN SODIUM SCH: 40 INJECTION SUBCUTANEOUS at 13:21

## 2024-10-30 NOTE — P.HPIM
History of Present Illness


H&P Date: 10/30/24


Chief Complaint: Cough





Pleasant 74-year-old patient, follows with Dr. Valdemar SEGUNDO.


Patient states she has had a cough for close to 2 years.  For 2 weeks he been ha

ving increasing cough.  Also noted some neck stiffness.  Pain in the back of the

neck going up to the back of the head.  Finding it difficult to turn his head.  

No fever no chills.  He did notice slight blurring of the vision.  Does concern 

for meningitis in the ER.  Was started on IV Unasyn and vancomycin.  And 

ceftriaxone.  Patient also had been feeling of fullness on the side of both the 

neck.  This morning actually feels better.  Lumbar puncture was ordered by ER.  

This morning interventional radiology communicated that they will not build to 

do the LP.  Has to be deferred to neurology.





Review of systems:


GEN.: Tired


EYES: None


HEENT: As above.  No headache per se.  No nausea vomiting.


NECK: As above


RESPIRATORY: None


CARDIOVASCULAR: None


GASTROINTESTINAL: None


GENITOURINARY: None


MUSCULOSKELETAL: None


LYMPHATICS: None


HEMATOLOGICAL: None


PSYCHIATRY: None


NEUROLOGICAL: None





Social history:


.  Retired .  Drinks average about 1 beer a day.





Physical examination:


VITAL SIGNS: 98.1, 61, 17, 155 x 78, 100% room air upon presentation


GENERAL: BMI 27.5, reclining bed of awake comfortable.


EYES: Pupils equal.  Conjunctiva sam l.


HEENT: External appearance of nose and ears normal, oral cavity grossly normal.


NECK: JVD not raised; masses not palpable.


HEART: First and second heart sounds are normal;  no edema.  


LUNGS: Respiratory rate normal; clear to auscultation.  


ABDOMEN: Soft,  nontender, liver spleen not palpable, no masses palpable.  


PSYCH: Alert and oriented x3;  mood  and affect sam l.  


MUSCULOSKELETAL:No Clubbing/cyanosis;muscles-grossly intact


NEUROLOGICAL: Cranial nerves grossly intact; no facial asymmetry,   power and 

sensation grossly intact.  No neck stiffness.  Able to touch his chin close to 

his chest


LYMPHATICS: No lymph nodes palpable in the axilla and neck





Investigation:


White count 7.9 hemoglobin 14.6 platelets 266 sodium 138 potassium 4 creatinine 

0.94


Influenza type A, type B, RSV, COVID-19, group A strep PCR: Not detected


Chest x-ray film personally reviewed by me-unremarkable


CT brain without contrast: Unremarkable


CT cervical spine without contrast: Some DJD changes  mid to lower cervical 

spine





Assessment plan:





-Neck stiffness.  Patient presents with cough for 2 weeks.  No fever no chills. 

No headache.  No nausea vomiting.  Had some neck stiffness finding it difficult 

to turn to the left or right.  Was concerned about meningitis in the ER.  

Patient started on antibiotics include IV Unasyn, ceftriaxone, vancomycin.  

Patient does feel better this morning.


Given patient presentation clinical suspicion for meningitis is rather low.  

Needs to be ruled out.  Neurology Dr. Allred consulted as intervention radiology

not able to do lumbar puncture.  Note that patient has normal white count with 

no shift.





-Suspicion for viral pharyngitis





-Cervical spine DJD


Tylenol as needed





-Essential hypertension


Telmisartan





Care was discussed with patient.  Questions answered.





Past Medical History


Past Medical History: Hypertension


History of Any Multi-Drug Resistant Organisms: None Reported


Additional Past Surgical History / Comment(s): bilateral hips, right shoulder 

replacement,


Smoking Status: Former smoker





Medications and Allergies


                                Home Medications











 Medication  Instructions  Recorded  Confirmed  Type


 


Telmisartan 20 mg PO DAILY 10/29/24 10/29/24 History








                                    Allergies











Allergy/AdvReac Type Severity Reaction Status Date / Time


 


bee venom protein (honey bee) Allergy  Anaphylaxis Verified 10/29/24 19:57














Physical Exam


Vitals: 


                                   Vital Signs











  Temp Pulse Pulse Resp BP BP Pulse Ox


 


 10/30/24 08:00  98.1 F   71  17   125/68  96


 


 10/30/24 01:27  97.9 F   60  17   153/78  100


 


 10/29/24 22:50  98.1 F   61  17   155/78  100


 


 10/29/24 22:39  97.9 F  61   16  137/78   98


 


 10/29/24 22:08   57 L   17  137/80   100


 


 10/29/24 18:45  97.5 F L  54 L    139/83   99


 


 10/29/24 16:15  98 F  75   18  143/99   99


 


 10/29/24 15:26  98.1 F  78   18  160/89   99








                                Intake and Output











 10/29/24 10/30/24 10/30/24





 22:59 06:59 14:59


 


Intake Total   120


 


Balance   120


 


Intake:   


 


  Oral   120


 


Other:   


 


  Voiding Method  Toilet 


 


  # Voids  1 


 


  Weight 84.368 kg  














Results


CBC & Chem 7: 


                                 10/29/24 16:45





                                 10/30/24 03:04


Labs: 


                  Abnormal Lab Results - Last 24 Hours (Table)











  10/29/24 Range/Units





  16:45 


 


Glucose  47 L*  (74-99)  mg/dL

## 2024-10-31 VITALS
TEMPERATURE: 98.6 F | DIASTOLIC BLOOD PRESSURE: 94 MMHG | RESPIRATION RATE: 17 BRPM | SYSTOLIC BLOOD PRESSURE: 160 MMHG | HEART RATE: 67 BPM

## 2024-10-31 LAB
ANION GAP SERPL CALC-SCNC: 2 MMOL/L
BUN SERPL-SCNC: 15 MG/DL (ref 9–20)
CALCIUM SPEC-MCNC: 8.1 MG/DL (ref 8.4–10.2)
CHLORIDE SERPL-SCNC: 113 MMOL/L (ref 98–107)
CO2 SERPL-SCNC: 25 MMOL/L (ref 22–30)
GLUCOSE SERPL-MCNC: 92 MG/DL (ref 74–99)
POTASSIUM SERPL-SCNC: 4.7 MMOL/L (ref 3.5–5.1)
SODIUM SERPL-SCNC: 140 MMOL/L (ref 137–145)

## 2024-10-31 RX ADMIN — NAPROXEN STA MG: 250 TABLET ORAL at 11:40

## 2024-10-31 NOTE — P.PN
Progress Note - Text


Progress Note Date: 10/31/24








CSF showed WBC 0, RBC 2, glucose 74, protein 89.  Gram stain showed no 

polymorphonuclear leukocyte, no organisms seen.


Comprehensive viral panel negative including HSV virus and VZV virus.  CSF VDRL 

negative.





CSF was normal, may discontinue antibiotics.





I also ordered blood test including ESR 5, CRP 0.60, hemoglobin A1c 5.5, B12 

459, folate 10.10 and TSH 0.620, all normal


No evidence of temporal arteritis.





Suggest MRI of the cervical spine and EMG of upper extremities (for numbness of 

both hands), which can be done as an outpatient.


Discussed with primary physician in detail.  He is planning to refer patient to 

orthopedic surgery outpatient.

## 2024-10-31 NOTE — P.CNNES
History of Present Illness


Consult date: 10/30/24


Requesting physician: Marlon Angeles


Reason for Consult: Lumbar puncture


History of Present Illness: 





Patient is a 74-year-old left-handed male came to the hospital yesterday at 3 PM

for new onset neck stiffness, visual disturbance.  Patient states that for last 

2 weeks he has developed stiffness in the neck, could not turn the head 

sideways.  The pain was involving back of the neck, back of the head, side of 

the neck to the ears.  He also had cough and sore throat for last 1 week.  He 

also noticed that he has problems with focusing, although denies any pain in the

eyes, however his vision felt like "fuzzy".  He was requiring glasses for near 

vision.  He denies any fever or chills, denies any neck injury.  He denies any 

moving furniture lately.  He saw his primary physician, who wanted to rule out 

meningitis therefore he was referred to the hospital.





Vital signs on arrival blood pressure 160/89, which improved to 143/99, pulse 

rate 78 temperature 98.1.  Patient has been afebrile.  Blood test shows normal 

CBC, with normal differential.  WBC count 7.9.  CMP is normal, but glucose was 

low 47.  Influenza, RSV, coronavirus and group A strep negative.  Chest x-ray 

showed no acute pulmonary process.  CT head revealed no acute intracranial 

process.  I personally reviewed CT head, agree with the findings.  CT of the 

cervical spine revealed no acute osseous abnormality cervical spine.  

Degenerative disc change, mid to lower cervical spine.





Home medication includes telmisartan 20 mg.  In the ER patient was started on 

acyclovir, ampicillin, ceftriaxone and vancomycin.  Patient states that his 

symptoms have remarkably improved.  His neck he also has been less stiff.  The s

ore throat, has improved.  Patient remains afebrile.





For the last 3 to 6 months, his hands get numb and tingly at night.  It involves

all 10 digits but particularly worse in the middle 2 fingers of the right hand. 

He denies any weakness of the arms or legs.  Denies any balance issues.  Patient

states that about 30 years ago, he slipped off a cab on the truck and held 

himself with his middle 2 fingers of the right hand to prevent the fall.  Did 

not hit his head or neck.  However since then he had problems with the middle 2 

fingers of the right hand.  Denies any shoulder pain.  Patient admits to having 

chronic back pain in himself as well as multiple family members.











Review of Systems





All pertinent positive and negative review of systems mentioned in the HPI.





Past Medical History


Past Medical History: Hypertension


History of Any Multi-Drug Resistant Organisms: None Reported


Additional Past Surgical History / Comment(s): bilateral hips, right shoulder 

replacement,


Past Psychological History: No Psychological Hx Reported


Smoking Status: Former smoker


Past Alcohol Use History: Daily


Past Drug Use History: None Reported





Medications and Allergies


                                Home Medications











 Medication  Instructions  Recorded  Confirmed  Type


 


Telmisartan 20 mg PO DAILY 10/29/24 10/29/24 History








                                    Allergies











Allergy/AdvReac Type Severity Reaction Status Date / Time


 


bee venom protein (honey bee) Allergy  Anaphylaxis Verified 10/29/24 19:57














Physical Examination





- Vital Signs


Vital Signs: 


                                   Vital Signs











  Temp Pulse Pulse Resp BP BP Pulse Ox


 


 10/30/24 08:00  98.1 F   71  17   125/68  96


 


 10/30/24 01:27  97.9 F   60  17   153/78  100


 


 10/29/24 22:50  98.1 F   61  17   155/78  100


 


 10/29/24 22:39  97.9 F  61   16  137/78   98


 


 10/29/24 22:08   57 L   17  137/80   100


 


 10/29/24 18:45  97.5 F L  54 L    139/83   99


 


 10/29/24 16:15  98 F  75   18  143/99   99


 


 10/29/24 15:26  98.1 F  78   18  160/89   99








                                Intake and Output











 10/29/24 10/30/24 10/30/24





 22:59 06:59 14:59


 


Intake Total   120


 


Balance   120


 


Intake:   


 


  Oral   120


 


Other:   


 


  Voiding Method  Toilet 


 


  # Voids  1 


 


  Weight 84.368 kg  














Patient is a young looking  male, in no acute distress.  Patient is 

very pleasant.


Patient is alert awake oriented to time place and person.  Speech and language 

functions are normal.  Patient can name and repeat very well.  No aphasia or 

dysarthria.  Attention, concentration and fund of knowledge is adequate. 





On cranial nerve examination, pupils are equal, round and reacting to light, 

visual fields are full on confrontation, with no neglect on double simultaneous 

stimulation.  Extraocular muscles are intact with no nystagmus.  Face is 

symmetric, tongue protrudes to the midline.  Palatal elevation and sensation 

normal, hearing and shoulder shrug normal, facial sensation normal.  





On muscle strength testing, there is no pronator drift and the strength is 

normal in arms and legs distally and proximally.





Deep tendon reflexes are (right/left) biceps 1/2+, brachioradialis 1/2+, knees 

3/3, ankles 1/1 and plantars are downgoing bilaterally.





Sensory to touch is equal with no neglect on double simultaneous stimulation.





Cerebellar function showed no ataxia for finger-to-nose testing.  No 

dysdiadochokinesia.  No ataxia for heel-to-shin testing on either side.  Tone 

and bulk of muscles normal.





Gait deferred..





On general examination, there is no carotid bruit or murmur, S1-S2 audible.  

Chest is clear on consultation.  Abdomen is soft nontender.  No organomegaly, 

bowel sounds present.   Peripheral pulses are present.  No peripheral edema.





Results





- Laboratory Findings


CBC and BMP: 


                                 10/29/24 16:45





                                 10/30/24 03:04


Abnormal Lab Findings: 


                                  Abnormal Labs











  10/29/24





  16:45


 


Glucose  47 L*














Assessment and Plan


Assessment: 





* 74-year-old male, came with 2-week history of neck stiffness, pain in the back

  and sides of the neck, and the posterior part and top of the head and a sore 

  throat.  No fever or chills.  Doubt meningitis.  However his symptoms have 

  improved since being on multiple antibiotics including acyclovir, ampicillin, 

  vancomycin and ceftriaxone.


* Hypertension








Plan: 





* There is low index of suspicion for meningitis.  However patient's symptoms 

  have improved since being on antibiotics as mentioned above.  Therefore at 

  this point, may have to consider lumbar puncture to rule out meningitis.  

  Discussed with primary physician, who agreed with proceeding with lumbar 

  puncture.


* Patient was informed of risks and benefits of lumbar puncture, and he consente

  d for it.


* Consider Mobic 15 mg daily for neck pain.  Patient probably have some degree 

  of cervical spondylosis.  He does have history of back pain as well.


* Neurology will follow.  Thank you for the consult.

## 2024-10-31 NOTE — P.DS
Providers


Date of admission: 


10/29/24 19:36





Expected date of discharge: 10/31/24


Attending physician: 


Marlon Angeles





Consults: 





                                        





10/30/24 11:28


Consult Physician Routine 


   Consulting Provider: Nilda Eller


   Consult Reason/Comments: lumbar puncture


   Do you want consulting provider notified?: Yes











Primary care physician: 


Valdemar CASTILLO [facility] Course: 





Chief Complaint: Cough





Pleasant 74-year-old patient, follows with Dr. Valdemar SEGUNDO.


Patient states she has had a cough for close to 2 years.  For 2 weeks he been 

having increasing cough.  Also noted some neck stiffness.  Pain in the back of 

the neck going up to the back of the head.  Finding it difficult to turn his 

head.  No fever no chills.  He did notice slight blurring of the vision.  Does 

concern for meningitis in the ER.  Was started on IV Unasyn and vancomycin.  And

ceftriaxone.  Patient also had been feeling of fullness on the side of both the 

neck.  This morning actually feels better.  Lumbar puncture was ordered by ER.  

This morning interventional radiology communicated that they will not build to 

do the LP.  Has to be deferred to neurology.





October 31: Patient continues to have no fever.  Patient did say that he has had

neck pain for years.  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 

about.  I am guessing by coughing he is made his neck pain worse.  CT scan did 

show significant arthritis.  Will start patient on naproxen and prednisone taper

and have the patient follow-up with Dr. Crawford outpatient for his cervical spine.

 I spoke to Dr. Eller from neurology.  CSF results discussed.  To discontinue 

antibiotics and antiviral.  Short course of Ceftin to be completed for his 

pharyngitis.  Questions answered


Discussion and discharge planning more than 35 minutes








Social history:


.  Retired .  Drinks average about 1 beer a day.





Physical examination:


VITAL SIGNS: 98.6, 67, 17, 160 x 94, 98% room air


GENERAL: BMI 27.5,, Philip


EYES: Pupils equal.  Conjunctiva sam l.


HEENT: External appearance of nose and ears normal, oral cavity grossly normal.


NECK: JVD not raised; masses not palpable.


HEART: First and second heart sounds are normal;  no edema.  


LUNGS: Respiratory rate normal; clear to auscultation.  


ABDOMEN: Soft,  nontender, liver spleen not palpable, no masses palpable.  


PSYCH: Alert and oriented x3;  mood  and affect sam l.  


MUSCULOSKELETAL:No Clubbing/cyanosis;muscles-grossly intact.  Some limitation of

neck movements in all directions.








Investigation:


October 31: Potassium 4.7 creatinine 0.98


CSF: Clear.  RBC 2.  Nucleated cells 0.  Glucose 94.  Total protein 89


White count 7.9 hemoglobin 14.6 platelets 266 sodium 138 potassium 4 creatinine 

0.94


Influenza type A, type B, RSV, COVID-19, group A strep PCR: Not detected


Chest x-ray film personally reviewed by me-unremarkable


CT brain without contrast: Unremarkable


CT cervical spine without contrast: Some DJD changes  mid to lower cervical 

spine





Assessment plan:





-Neck stiffness from acute flareup of cervical spine DJD.


CT scan showing significant changes.  Will treat with a course of naproxen, 

prednisone.


Follow-up with Dr. MISA Crawford outpatient in 2 weeks.





-Acute pharyngitis


Completed short course of Ceftin





-Meningitis ruled out





-Essential hypertension


Telmisartan





Disposition:


Home





Past Medical History


Past Medical History: Hypertension


History of Any Multi-Drug Resistant Organisms: None Reported


Additional Past Surgical History / Comment(s): bilateral hips, right shoulder 

replacement,


Smoking Status: Former smoker





Plan - Discharge Summary


Discharge Rx Participant: No


New Discharge Prescriptions: 


New


   Naproxen [Naprosyn] 250 mg PO TID #42 tab


   predniSONE 10 mg PO DAILY #30 tab


   cefUROXime axetiL [Ceftin] 500 mg PO BID #10 tab





Continue


   Telmisartan 20 mg PO DAILY


Discharge Medication List





Telmisartan 20 mg PO DAILY 10/29/24 [History]


Naproxen [Naprosyn] 250 mg PO TID #42 tab 10/31/24 [Rx]


cefUROXime axetiL [Ceftin] 500 mg PO BID #10 tab 10/31/24 [Rx]


predniSONE 10 mg PO DAILY #30 tab 10/31/24 [Rx]








Follow up Appointment(s)/Referral(s): 


Valdemar Segundo DO [Primary Care Provider] - 11/08/24 9:15 am


MISA Crawford DO [Doctor of Osteopathic Medicine] - 2 Weeks


Discharge Disposition: HOME SELF-CARE

## 2024-10-31 NOTE — P.PCN
Date of Procedure: 10/30/24


Preoperative Diagnosis: 


Rule out meningitis


Postoperative Diagnosis: 


Rule out meningitis


Procedure(s) Performed: 


Lumbar puncture


Anesthesia: local


Surgeon: Nilda Eller


Estimated Blood Loss (ml): 1


Condition: stable


Disposition: floor


Indications for Procedure: 


Headache, neck stiffness, sore throat, rule out meningitis


Description of Procedure: 


Informed consent was obtained from patient.  Very detailed risks and benefits of

the procedure, and the indication of procedure was explained to the patient and 

her son in the presence of nurse.   Patient was informed of the potential risk 

of infection, bleeding, numbness, back pain, and the features of post spinal 

headache and the treatment.





Patient was placed on the side of the bed in a sitting position.  L4 lumbar 

space was identified and marked.  Procedure performed under very strict aseptic 

conditions.  Low back region was sterilized with ChloraPrep and then Betadine.  

Low back region was anesthetized with 1% lidocaine.  At at L4 lumbar space, the 

spinal needle, 3.5 inch, 20-gauge was inserted.  I was getting a lot of 

resistance, tried a few times.  I consented with the patient about trying lumbar

puncture on 1 space higher.  He agreed.  Then I anesthetized the L3 lumbar space

with lidocaine.  After a couple attempts, I was able to enter subarachnoid 

space.  Spinal fluid was traumatic and very slightly blood tinged in the first 

tube, and cleared after in the second tube.  About 10 mL of clear and colorless 

spinal fluid obtained in 4 tubes.  Stylet was reintroduced, and then spinal 

needle withdrawn.  Band-Aid was applied.  Patient was recommended to lay flat 

for half an hour.  Patient tolerated the procedure very well.

## 2024-11-01 LAB — VDRL CSF-TITR: (no result) TITER
